# Patient Record
(demographics unavailable — no encounter records)

---

## 2025-02-26 NOTE — HISTORY OF PRESENT ILLNESS
[Never] : never [TextBox_4] : The patient is a 46-year-old F with PMHx of GERD, seasonal allergies, here today for evaluation of dyspnea, wheezing, cough. Has no hx of asthma or lung disease. Had URI fall 2024 and persistent cough, wheezing, dyspnea following. She was prescribed albuterol nebs and albuterol inhaler by her PCP, found they were largely ineffective. In January, she was prescribed a two week sample of Trelegy by her PCP which largely resolved all of her symptoms. Has now been off of Trelegy for about two weeks. Did have some wheezing/chest tightness a few nights ago after walking outside in the cold air. Cold air seems to trigger her symptoms. Did have WTC exposure; [place]. Patient's ex-boyfriend was also a smoker. Otherwise, no known environmental exposures. [ESS] : 2

## 2025-02-26 NOTE — ASSESSMENT
[FreeTextEntry1] : The patient is a 46-year-old F with PMHx of GERD, seasonal allergies, here today for evaluation of dyspnea, wheezing, cough following URI fall 2024. Symptoms largely resolved with 2-week trial of Trelegy prescribed by PCP in January 2025. Discussed that patient likely had airway reactivity following URI. Does have WTC exposure. Needs PFTs for evaluation of lung function. Prescribed AirSupra inhaler for PRN use. Patient was advised to reach out if use of AirSupra becomes frequent and would need to discuss daily maintenance inhaler.   Follow-up in 2 months for PFTs.

## 2025-02-26 NOTE — END OF VISIT
[FreeTextEntry3] :   I, Addie Douglas MD, personally performed the evaluation and management (E/M) services for this patient. That E/M includes conducting the clinically appropriate initial history &/or exam, assessing all conditions, and establishing the plan of care. I have also independently reviewed the medical records and imaging at the time of this office visit, and discussed the above mentioned images with interpretations with the patient. Today, PINA Hernadez was here to participate in the visit & follow plan of care established by me.

## 2025-06-25 NOTE — REASON FOR VISIT
[Home] : at home, [unfilled] , at the time of the visit. [Medical Office: ([facility])___] : at the medical office located in  [Telehealth (audio & video)] : This visit was provided via telehealth using real-time 2-way audio visual technology. [Verbal consent obtained from patient] : the patient, [unfilled] [Follow-Up] : a follow-up visit [Asthma] : asthma

## 2025-06-25 NOTE — HISTORY OF PRESENT ILLNESS
[Never] : never [TextBox_4] : The patient is a 46-year-old F with PMHx of GERD, seasonal allergies, here today for evaluation of dyspnea, wheezing, cough. Has no hx of asthma or lung disease. Had URI fall 2024 and persistent cough, wheezing, dyspnea following. She was prescribed albuterol nebs and albuterol inhaler by her PCP, found they were largely ineffective. In January, she was prescribed a two week sample of Trelegy by her PCP which largely resolved all of her symptoms. Has now been off of Trelegy for about two weeks. Did have some wheezing/chest tightness a few nights ago after walking outside in the cold air. Cold air seems to trigger her symptoms. Did have WTC exposure; [place]. Patient's ex-boyfriend was also a smoker. Otherwise, no known environmental exposures.    6/25/2025 doing well off trelegy rare use of airsupra tolerance level at baseline [ESS] : 3

## 2025-06-25 NOTE — ASSESSMENT
[FreeTextEntry1] : REVIEWED PFTs 5/2025: fev1 86, fev/fvc 64, tlc 99, dlco 71 6MWT 5/2025: no desats; 610 meters walked  The patient is a 46-year-old F with PMHx of GERD, seasonal allergies, following up for wheezing, cough following URI fall 2024. Symptoms largely resolved with 2-week trial of Trelegy prescribed by PCP in January 2025. Discussed that patient likely had airway reactivity following URI. Does have WTC exposure. Has been doing well off all inhalers. PFTs with mild obstruction, may WTC versus asthma related. If starts to have more AirSupra use should follow up, otherwise follow up annually.